# Patient Record
(demographics unavailable — no encounter records)

---

## 2025-03-28 NOTE — DATA REVIEWED
[FreeTextEntry1] :  Previously reviewed:  [facility] LT SHOULDER MRI 3/14/24: There is no major rotator cuff tear.  The biceps is subluxed into a partial subscapularis tear.  There is a supraspinatus ganglion cyst with limited partial tearing.  There is a small lipoma seen.  There are minor AC changes.   MRI L SHOULDER [facility] 6/19/23: There is a partial rotator cuff tear. There is AC inflammation. The muscle is good. Slight biceps and subscapularis changes noted. Sentinal cyst noted.

## 2025-03-28 NOTE — ASSESSMENT
[FreeTextEntry1] : We again discussed his course.  We again discussed options including surgery. He is indicated for arthroscopic repair including biceps tenodesis and rtc repair  A repeat MRI is necessary.  Naproxen 440mg, q12h for 10-14 days is prescribed, with risk of GI symptoms reviewed. He knows he can add 2 extra Tylenol to this.  Following up with his wife is advised.  Questions answered.  Patient seen by Gabriella Martin M.D., am scribing for Dr. Surendra Callejas in his presence for the chief complaint, physical exam, studies, assessment, and/or plan.

## 2025-03-28 NOTE — HISTORY OF PRESENT ILLNESS
[6] : 6 [2] : 2 [Full time] : Work status: full time [] : yes [FreeTextEntry1] : Left shoulder [de-identified] : HEP/stretching, icing/heating [de-identified] :

## 2025-03-28 NOTE — REASON FOR VISIT
[FreeTextEntry2] : WC 5/26/23: This is a 39 year old RHD M  with left shoulder pain since moving heavy weights.  He felt an acute sensation with pain.  No prior history.  No numbness.  He typically doesn't lift heavy.  The L SA/GH injection in June 2023 helped.  He continues with PT and his HEP.  He feels the same since last visit. He did take the MDP, which helped while on it. He is no longer in PT. The Voltaren gel was not approved.  The 1/22/24 L SA injection helped.  There is still pain.  He is unsure if he wants to proceed with surgery. 03/28/2025: He reports having continued pain.

## 2025-03-28 NOTE — PHYSICAL EXAM
[Left] : left shoulder [Sitting] : sitting [Moderate] : moderate [Mild] : mild [5___] : external rotation 5[unfilled]/5 [Right] : right shoulder [5 ___] : forward flexion 5[unfilled]/5 [] : no sensory deficits [FreeTextEntry3] : No lipoma noted.  [FreeTextEntry8] : There is lesser tuberosity tenderness.  [de-identified] : ~bellypress The fatigue is slight.  [FreeTextEntry9] : IR to T10 [TWNoteComboBox4] : passive forward flexion 170 degrees [de-identified] : external rotation 75 degrees

## 2025-04-28 NOTE — REASON FOR VISIT
[FreeTextEntry2] : WC 5/26/23: This is a 39 year old RHD M  with left shoulder pain since moving heavy weights.  He felt an acute sensation with pain.  No prior history.  No numbness.  He typically doesn't lift heavy.  The L SA/GH injection in June 2023 helped.  He continues with PT and his HEP.  He feels the same since last visit. He did take the MDP, which helped while on it. He is no longer in PT. The Voltaren gel was not approved.  The 1/22/24 L SA injection helped.  There is still pain.  He is unsure if he wants to proceed with surgery. 03/28/2025: He reports having continued pain.  On 4/28/25, the L SH MRI was done.  The pain is unchanged.

## 2025-04-28 NOTE — DATA REVIEWED
[FreeTextEntry1] : [facility] L SH MRI 4/22/25:   I independently reviewed the images for the first time on 4/28/25, and the clinically significant findings include there is no major rotator cuff tear.  The biceps is still slightly subluxed into a partial subscapularis tear.  The supraspinatus ganglion cyst is much smaller, with limited partial tearing.  There is a small lipoma seen.  There are minor AC changes.   Previously reviewed:  [facility] LT SHOULDER MRI 3/14/24: There is no major rotator cuff tear.  The biceps is subluxed into a partial subscapularis tear.  There is a supraspinatus ganglion cyst with limited partial tearing.  There is a small lipoma seen.  There are minor AC changes.   MRI L SHOULDER [facility] 6/19/23: There is a partial rotator cuff tear. There is AC inflammation. The muscle is good. Slight biceps and subscapularis changes noted. Sentinal cyst noted.

## 2025-04-28 NOTE — ASSESSMENT
[FreeTextEntry1] :  We reviewed the MRI findings. We again discussed options including surgery. He is indicated for arthroscopic repair including biceps tenodesis and cuff repair  Thru shared decision making, we will cont to follow this as he is able to function decently and the tears have not progressed. Naproxen 440mg, q12h for 10-14 days is prescribed, with risk of GI symptoms reviewed. He knows he can add 2 extra Tylenol to this.  PT is prescribed. Timing issues noted. Questions answered.  Patient seen by Gabriella Martin M.D., am scribing for Dr. Surendra Callejas in his presence for the chief complaint, physical exam, studies, assessment, and/or plan.

## 2025-04-28 NOTE — PHYSICAL EXAM
[Left] : left shoulder [Sitting] : sitting [Moderate] : moderate [Mild] : mild [5 ___] : forward flexion 5[unfilled]/5 [5___] : external rotation 5[unfilled]/5 [Right] : right shoulder [] : no sensory deficits [FreeTextEntry3] : No lipoma noted.  [FreeTextEntry8] : There is lesser tuberosity tenderness.  [de-identified] : ~bellypress The fatigue is slight.  [FreeTextEntry9] : IR to T10 [TWNoteComboBox4] : passive forward flexion 170 degrees [de-identified] : external rotation 75 degrees

## 2025-04-28 NOTE — HISTORY OF PRESENT ILLNESS
[5] : 5 [Full time] : Work status: full time [] : yes [FreeTextEntry1] : Left shoulder  [de-identified] : MRI Left shoulder [facility]ent 04/22/2025 [de-identified] : Naproxen prn [de-identified] :